# Patient Record
Sex: FEMALE
[De-identification: names, ages, dates, MRNs, and addresses within clinical notes are randomized per-mention and may not be internally consistent; named-entity substitution may affect disease eponyms.]

---

## 2018-01-09 ENCOUNTER — HOSPITAL ENCOUNTER (EMERGENCY)
Dept: HOSPITAL 31 - C.ER | Age: 73
Discharge: HOME | End: 2018-01-09
Payer: MEDICARE

## 2018-01-09 VITALS — SYSTOLIC BLOOD PRESSURE: 121 MMHG | DIASTOLIC BLOOD PRESSURE: 65 MMHG | HEART RATE: 78 BPM | RESPIRATION RATE: 16 BRPM

## 2018-01-09 VITALS — BODY MASS INDEX: 37 KG/M2

## 2018-01-09 VITALS — TEMPERATURE: 98.3 F

## 2018-01-09 DIAGNOSIS — S09.90XA: Primary | ICD-10-CM

## 2018-01-09 DIAGNOSIS — S80.12XA: ICD-10-CM
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
## 2018-01-09 DIAGNOSIS — S43.401A: ICD-10-CM
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
## 2018-01-09 NOTE — C.PDOC
History Of Present Illness





71 y/o fmela with dm, htn and gastric ulcer c/o pain to head, left shin, right 

lateral neck and right side back after she tripped over a flat cart in home 

depot yesterday.  pt hit head byt no loc. pt took 2 advil with no relief. 


Time Seen by Provider: 18 15:08


Chief Complaint (Nursing): Lower Extremity Problem/Injury


History Per: Patient, Family


History/Exam Limitations: no limitations


Onset/Duration Of Symptoms: Days (2)


Current Symptoms Are (Timing): Still Present


Severity: Moderate





Past Medical History


Reviewed: Historical Data, Nursing Documentation, Vital Signs


Vital Signs: 


 Last Vital Signs











Temp  98.3 F   18 13:54


 


Pulse  78   18 17:36


 


Resp  16   18 17:36


 


BP  121/65   18 17:36


 


Pulse Ox  99   18 09:48














- Medical History


PMH: Diabetes, HTN


Family History: States: Unknown Family Hx





- Social History


Hx Alcohol Use: No


Hx Substance Use: No





- Immunization History


Hx Tetanus Toxoid Vaccination: No


Hx Influenza Vaccination: No


Hx Pneumococcal Vaccination: No





Review Of Systems


Constitutional: Negative for: Fever, Chills


Eyes: Negative for: Vision Change


Cardiovascular: Negative for: Chest Pain


Musculoskeletal: Positive for: Neck Pain (lateral right), Leg Pain (left)


Skin: Positive for: Bruising (left shin)


Neurological: Negative for: Weakness, Numbness





Physical Exam





- Physical Exam


Appears: Non-toxic, No Acute Distress


Skin: Warm, Dry


Head: Atraumatic, Normacephalic, No Swelling, No Abrasion


Eye(s): bilateral: Normal Inspection


Neck: No Midline Cervical Tenderness, Paracervical Tenderness (right side)


Chest: Symmetrical, No Deformity, No Tenderness


Cardiovascular: Rhythm Regular, No Murmur


Respiratory: No Decreased Breath Sounds


Gastrointestinal/Abdominal: Soft, No Tenderness


Back: Normal Inspection, No Vertebral Tenderness, Other (tender right trappzius)


Extremity: Other (left shin bruised tender mild swelling. from at left knee and 

hip. tender right trapezius area, )


Pulses: Left Radial: Normal, Right Radial: Normal, Left Dorsalis Pedis: Normal, 

Right Dorsalis Pedis: Normal


Neurological/Psych: Oriented x3, Normal Speech, Normal Cognition, Normal Motor, 

Normal Sensation





ED Course And Treatment


O2 Sat by Pulse Oximetry: 99





- Other Rad


  ** tibia


X-Ray: Read By Radiologist


Interpretation: No fracture





- CT Scan/US


  ** head


Other Rad Studies (CT/US): Radiology Report Reviewed


CT/US Interpretation: No acute intracranial pathology identified. Findings as 

above.





Medical Decision Making


Medical Decision Makin p,  pt feeling better after medication.   head ct neg for bleeding. xray 

neg for fx in tibia.  abnormal findings on head ct  explained to pt, will give 

copy of results to her to take to pmd for further evaluation. 





Disposition


Counseled Patient/Family Regarding: Studies Performed, Diagnosis, Need For 

Followup, Rx Given





- Disposition


Referrals: 


Benitez Bass MD [Medical Doctor] - 


Disposition: HOME/ ROUTINE


Disposition Time: 17:18


Condition: IMPROVED


Additional Instructions: 





Quite el parche despus de 12 horas. [place] Tylenol segn lo indicado. Compresas 

tibias al dorso superior derecho. Te sentirs adolorido y adolorido por unos d

as. Por favor, vanessa un seguimiento con ambriz mdico dentro de unos polo. Traiga un 

informe de ct scan de la [place] cuando jorgito a ambriz mdico para eliu evaluacin 

externa adicional.Take patch off after 12 hours. Take Tylenol as directed.  

Warm compresses to right side upper back. You will feel sore and achy for a few 

days.  Please follow up with your doctor in a few days- bring ct scan report of 

head when you see your doctor for further outpatient evaluation. 








Prescriptions: 


Acetaminophen [Tylenol 325mg tab] 650 mg PO Q6 #30 tab


Instructions:  Head Injury (ED), Musculoskeletal Pain (ED)


Forms:  Gen Discharge Inst [place], [facility] ([place])


Print Language: Nepali





- Clinical Impression


Clinical Impression: 


 Fall from other slipping, tripping, or stumbling, Contusion of left lower leg, 

Sprain of right shoulder, Head injury, closed

## 2018-01-09 NOTE — CT
PROCEDURE:  CT HEAD WITHOUT CONTRAST.



HISTORY:

hit head last night



COMPARISON:

None available. 



TECHNIQUE:

Axial computed tomography images were obtained through the head/brain 

without intravenous contrast.  



Radiation dose:



Total exam DLP = 706.71 mGy-cm.



This CT exam was performed using one or more of the following dose 

reduction techniques: Automated exposure control, adjustment of the 

mA and/or kV according to patient size, and/or use of iterative 

reconstruction technique.



FINDINGS:



HEMORRHAGE:

No intracranial hemorrhage. 



BRAIN:

Diffuse atrophy with prominence of the ventricles and sulci noted. No 

mass effect or edema. Intracranial atherosclerosis. Scattered 

periventricular and subcortical white matter hypodensities, which are 

nonspecific, but often seen with chronic microvascular ischemic 

disease. Please note that MRI with diffusion imaging is more 

sensitive in the detection of acute ischemic event.



VENTRICLES:

No hydrocephalus. 



CALVARIUM:

Unremarkable.



PARANASAL SINUSES:

Unremarkable as visualized. No significant inflammatory changes.



MASTOID AIR CELLS:

Unremarkable as visualized. No inflammatory changes.



OTHER FINDINGS:

None.



IMPRESSION:

No acute intracranial pathology identified. Findings as above.

## 2018-01-09 NOTE — RAD
PROCEDURE:  Radiographs of the left tibia and fibula. 



HISTORY:

hit leg yesterday



COMPARISON:

None available.



TECHNIQUE:

Frontal and lateral views obtained. 



FINDINGS:



BONES:

No fracture or destructive lesion. Superior patellar spurring 

-blending quadriceps insertional enthesophyte. Tiny inferior 

calcaneal spur 



JOINT SPACES:

Unremarkable.



OTHER FINDINGS:

Faint arterial vascular calcifications.  Tiny os peroneum 

developmental variant 



IMPRESSION:

No fracture

## 2018-01-11 VITALS — OXYGEN SATURATION: 99 %
